# Patient Record
Sex: FEMALE | Race: WHITE | NOT HISPANIC OR LATINO | URBAN - METROPOLITAN AREA
[De-identification: names, ages, dates, MRNs, and addresses within clinical notes are randomized per-mention and may not be internally consistent; named-entity substitution may affect disease eponyms.]

---

## 2018-03-14 NOTE — ASU PATIENT PROFILE, ADULT - VISION (WITH CORRECTIVE LENSES IF THE PATIENT USUALLY WEARS THEM):
Partially impaired: cannot see medication labels or newsprint, but can see obstacles in path, and the surrounding layout; can count fingers at arm's length/wears glasses/contacts

## 2018-03-15 ENCOUNTER — INPATIENT (INPATIENT)
Facility: HOSPITAL | Age: 33
LOS: 0 days | Discharge: HOME | DRG: 781 | End: 2018-03-16
Attending: OBSTETRICS & GYNECOLOGY | Admitting: OBSTETRICS & GYNECOLOGY
Payer: COMMERCIAL

## 2018-03-15 ENCOUNTER — OUTPATIENT (OUTPATIENT)
Dept: OUTPATIENT SERVICES | Facility: HOSPITAL | Age: 33
LOS: 1 days | Discharge: HOME | End: 2018-03-15
Payer: COMMERCIAL

## 2018-03-15 ENCOUNTER — RESULT REVIEW (OUTPATIENT)
Age: 33
End: 2018-03-15

## 2018-03-15 VITALS
HEART RATE: 73 BPM | RESPIRATION RATE: 16 BRPM | OXYGEN SATURATION: 99 % | SYSTOLIC BLOOD PRESSURE: 101 MMHG | DIASTOLIC BLOOD PRESSURE: 64 MMHG | WEIGHT: 138.01 LBS

## 2018-03-15 VITALS
RESPIRATION RATE: 20 BRPM | OXYGEN SATURATION: 100 % | TEMPERATURE: 98 F | WEIGHT: 118.61 LBS | SYSTOLIC BLOOD PRESSURE: 115 MMHG | HEIGHT: 64 IN | HEART RATE: 68 BPM | DIASTOLIC BLOOD PRESSURE: 65 MMHG

## 2018-03-15 VITALS
HEART RATE: 86 BPM | DIASTOLIC BLOOD PRESSURE: 57 MMHG | OXYGEN SATURATION: 99 % | SYSTOLIC BLOOD PRESSURE: 97 MMHG | RESPIRATION RATE: 23 BRPM

## 2018-03-15 DIAGNOSIS — Z98.891 HISTORY OF UTERINE SCAR FROM PREVIOUS SURGERY: Chronic | ICD-10-CM

## 2018-03-15 DIAGNOSIS — Z98.890 OTHER SPECIFIED POSTPROCEDURAL STATES: Chronic | ICD-10-CM

## 2018-03-15 LAB
ALBUMIN SERPL ELPH-MCNC: 3.5 G/DL — SIGNIFICANT CHANGE UP (ref 3.3–5)
ALP SERPL-CCNC: 39 U/L — LOW (ref 40–120)
ALT FLD-CCNC: 18 U/L — SIGNIFICANT CHANGE UP (ref 10–45)
ANION GAP SERPL CALC-SCNC: 14 MMOL/L — SIGNIFICANT CHANGE UP (ref 5–17)
APTT BLD: 26.6 SEC — LOW (ref 27.5–37.4)
AST SERPL-CCNC: 19 U/L — SIGNIFICANT CHANGE UP (ref 10–40)
BILIRUB SERPL-MCNC: 0.7 MG/DL — SIGNIFICANT CHANGE UP (ref 0.2–1.2)
BUN SERPL-MCNC: 9 MG/DL — SIGNIFICANT CHANGE UP (ref 7–23)
CALCIUM SERPL-MCNC: 8.8 MG/DL — SIGNIFICANT CHANGE UP (ref 8.4–10.5)
CHLORIDE SERPL-SCNC: 97 MMOL/L — SIGNIFICANT CHANGE UP (ref 96–108)
CO2 SERPL-SCNC: 20 MMOL/L — LOW (ref 22–31)
CREAT SERPL-MCNC: 0.53 MG/DL — SIGNIFICANT CHANGE UP (ref 0.5–1.3)
GLUCOSE SERPL-MCNC: 155 MG/DL — HIGH (ref 70–99)
HCT VFR BLD CALC: 29.6 % — LOW (ref 34.5–45)
HGB BLD-MCNC: 9.8 G/DL — LOW (ref 11.5–15.5)
INR BLD: 1.15 — SIGNIFICANT CHANGE UP (ref 0.88–1.16)
LYMPHOCYTES # BLD AUTO: 13 % — SIGNIFICANT CHANGE UP (ref 13–44)
MCHC RBC-ENTMCNC: 28.5 PG — SIGNIFICANT CHANGE UP (ref 27–34)
MCHC RBC-ENTMCNC: 33.1 G/DL — SIGNIFICANT CHANGE UP (ref 32–36)
MCV RBC AUTO: 86 FL — SIGNIFICANT CHANGE UP (ref 80–100)
MONOCYTES NFR BLD AUTO: 3 % — SIGNIFICANT CHANGE UP (ref 2–14)
NEUTROPHILS NFR BLD AUTO: 80 % — HIGH (ref 43–77)
PLATELET # BLD AUTO: 244 K/UL — SIGNIFICANT CHANGE UP (ref 150–400)
POTASSIUM SERPL-MCNC: 3.4 MMOL/L — LOW (ref 3.5–5.3)
POTASSIUM SERPL-SCNC: 3.4 MMOL/L — LOW (ref 3.5–5.3)
PROT SERPL-MCNC: 6.2 G/DL — SIGNIFICANT CHANGE UP (ref 6–8.3)
PROTHROM AB SERPL-ACNC: 12.8 SEC — HIGH (ref 9.8–12.7)
RBC # BLD: 3.44 M/UL — LOW (ref 3.8–5.2)
RBC # FLD: 12.6 % — SIGNIFICANT CHANGE UP (ref 10.3–16.9)
SODIUM SERPL-SCNC: 131 MMOL/L — LOW (ref 135–145)
WBC # BLD: 15.6 K/UL — HIGH (ref 3.8–10.5)
WBC # FLD AUTO: 15.6 K/UL — HIGH (ref 3.8–10.5)

## 2018-03-15 PROCEDURE — 99285 EMERGENCY DEPT VISIT HI MDM: CPT | Mod: 25

## 2018-03-15 PROCEDURE — 88305 TISSUE EXAM BY PATHOLOGIST: CPT

## 2018-03-15 PROCEDURE — 93010 ELECTROCARDIOGRAM REPORT: CPT | Mod: 59

## 2018-03-15 PROCEDURE — 86900 BLOOD TYPING SEROLOGIC ABO: CPT

## 2018-03-15 PROCEDURE — 59820 CARE OF MISCARRIAGE: CPT

## 2018-03-15 PROCEDURE — 86850 RBC ANTIBODY SCREEN: CPT

## 2018-03-15 PROCEDURE — 86901 BLOOD TYPING SEROLOGIC RH(D): CPT

## 2018-03-15 RX ORDER — SODIUM CHLORIDE 9 MG/ML
1000 INJECTION INTRAMUSCULAR; INTRAVENOUS; SUBCUTANEOUS ONCE
Qty: 0 | Refills: 0 | Status: COMPLETED | OUTPATIENT
Start: 2018-03-15 | End: 2018-03-15

## 2018-03-15 RX ORDER — HYDROMORPHONE HYDROCHLORIDE 2 MG/ML
0.5 INJECTION INTRAMUSCULAR; INTRAVENOUS; SUBCUTANEOUS
Qty: 0 | Refills: 0 | Status: DISCONTINUED | OUTPATIENT
Start: 2018-03-15 | End: 2018-03-15

## 2018-03-15 RX ORDER — ACETAMINOPHEN 500 MG
650 TABLET ORAL EVERY 6 HOURS
Qty: 0 | Refills: 0 | Status: DISCONTINUED | OUTPATIENT
Start: 2018-03-15 | End: 2018-03-16

## 2018-03-15 RX ORDER — IBUPROFEN 200 MG
600 TABLET ORAL EVERY 6 HOURS
Qty: 0 | Refills: 0 | Status: DISCONTINUED | OUTPATIENT
Start: 2018-03-15 | End: 2018-03-16

## 2018-03-15 RX ORDER — SODIUM CHLORIDE 9 MG/ML
1000 INJECTION, SOLUTION INTRAVENOUS
Qty: 0 | Refills: 0 | Status: DISCONTINUED | OUTPATIENT
Start: 2018-03-15 | End: 2018-03-15

## 2018-03-15 RX ORDER — SODIUM CHLORIDE 9 MG/ML
1000 INJECTION, SOLUTION INTRAVENOUS
Qty: 0 | Refills: 0 | Status: DISCONTINUED | OUTPATIENT
Start: 2018-03-15 | End: 2018-03-16

## 2018-03-15 RX ORDER — KETOROLAC TROMETHAMINE 30 MG/ML
30 SYRINGE (ML) INJECTION ONCE
Qty: 0 | Refills: 0 | Status: DISCONTINUED | OUTPATIENT
Start: 2018-03-15 | End: 2018-03-15

## 2018-03-15 RX ORDER — OXYCODONE HYDROCHLORIDE 5 MG/1
10 TABLET ORAL ONCE
Qty: 0 | Refills: 0 | Status: DISCONTINUED | OUTPATIENT
Start: 2018-03-15 | End: 2018-03-15

## 2018-03-15 RX ADMIN — Medication 30 MILLIGRAM(S): at 17:51

## 2018-03-15 RX ADMIN — SODIUM CHLORIDE 125 MILLILITER(S): 9 INJECTION, SOLUTION INTRAVENOUS at 23:36

## 2018-03-15 RX ADMIN — SODIUM CHLORIDE 1000 MILLILITER(S): 9 INJECTION INTRAMUSCULAR; INTRAVENOUS; SUBCUTANEOUS at 20:25

## 2018-03-15 RX ADMIN — SODIUM CHLORIDE 1000 MILLILITER(S): 9 INJECTION INTRAMUSCULAR; INTRAVENOUS; SUBCUTANEOUS at 21:18

## 2018-03-15 RX ADMIN — Medication 30 MILLIGRAM(S): at 17:43

## 2018-03-15 NOTE — ED ADULT NURSE REASSESSMENT NOTE - NS ED NURSE REASSESS COMMENT FT1
pt remains aaxo3 no deficits no sob no chest pain no n/v.  no vaginal bleeding.  no dizziness.  color remains pale.  no shivering.  seen by OB/Gyn resident.

## 2018-03-15 NOTE — H&P ADULT - NSHPPHYSICALEXAM_GEN_ALL_CORE
Vital Signs Last 24 Hrs  T(C): 36.3 (15 Mar 2018 16:45), Max: 36.9 (15 Mar 2018 13:00)  T(F): 97.4 (15 Mar 2018 16:45), Max: 98.5 (15 Mar 2018 13:00)  HR: 74 (15 Mar 2018 22:20) (60 - 86)  BP: 93/66 (15 Mar 2018 22:20) (89/52 - 115/65)  BP(mean): 72 (15 Mar 2018 18:30) (66 - 72)  RR: 16 (15 Mar 2018 22:20) (11 - 23)  SpO2: 97% (15 Mar 2018 22:20) (97% - 100%)    Gen: Pale, NAD, AOx3  Chest: normal work of breathing  Abdomen: soft, nontender, nondistended, no rebound or guarding  Extremities: cool, MAEW

## 2018-03-15 NOTE — ED PROVIDER NOTE - OBJECTIVE STATEMENT
31 yo completed D&C today with syncopal episode in lobby for a few seconds. felt lightheaded per pt, no assoc seizure, prior cardiac activity, early family hx of cardiac disease. some vaginal spotting on pad after surgery but no heavy bleeding, per Dr. Leahy, surgery went well, no indication of perforation/rupture. pt was at 12 weeks. 33 yo completed D&C today with syncopal episode in lobby for a few seconds. felt lightheaded per pt, no assoc seizure, prior cardiac activity, early family hx of cardiac disease. some vaginal spotting on pad after surgery but no heavy bleeding, per Dr. Leahy, surgery went well, no indication of perforation/rupture. pt was at 12 weeks. NPO since am with surgery at 3pm.

## 2018-03-15 NOTE — PACU DISCHARGE NOTE - COMMENTS
VSS. Moderate vaginal bleeding noted. Denies pain. Voided 350cc. Tolerated PO fluids and light snack. D/C instructions given. Pt escorted home with  and mother. Safety maintained

## 2018-03-15 NOTE — H&P ADULT - ASSESSMENT
31 yo  POD0 s/p suction D&C for Usher syndrome at 12wks w/ an episode of syncope, now with stable vitals and no evidence of perforation or retained products. Will admit for IV hydration.  -Reg diet  -LR@125  -repeat CBC in AM  -SCDs  -Plan to dc in AM if stable

## 2018-03-15 NOTE — H&P ADULT - HISTORY OF PRESENT ILLNESS
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 Patient was seated at the time and did not fall or hit her head. Felt lightheaded when sitting upright but feels better after receiving 2 L IV hydration in the ER. Denies palpitations, SOB, heavy bleeding. +Chills.     D&C was uncomplicated, low concern for perforation per attending,  cc.    Obhx: C/S for breech at 38w, uncomplicated  Gynhx: denies  PMH: denies  PSH: C/S, suction D&C today, rhinoplasty  Meds: PNV  NKDA

## 2018-03-15 NOTE — ED ADULT NURSE NOTE - OBJECTIVE STATEMENT
pt is s/p D&C today and was discharged from same day surgery and had a syncopal episode witnessed by her .  rapid response was called and pt brought to ED. pt is s/p D&C today and was discharged from same day surgery and had a syncopal episode witnessed by her .  rapid response was called and pt brought to ED.  pt arrives aaox3.  color very pale.  skin cool and damp.  no sob.  no n/v.  no chest pain.  no dizziness.  states she feels weak.

## 2018-03-15 NOTE — H&P ADULT - NSHPLABSRESULTS_GEN_ALL_CORE
9.8    15.6  )-----------( 244      ( 15 Mar 2018 20:36 )             29.6   03-15    131<L>  |  97  |  9   ----------------------------<  155<H>  3.4<L>   |  20<L>  |  0.53    Ca    8.8      15 Mar 2018 20:36    TPro  6.2  /  Alb  3.5  /  TBili  0.7  /  DBili  x   /  AST  19  /  ALT  18  /  AlkPhos  39<L>  03-15    Bedside sono performed with senior resident Dr. Rodriguez, PGY4:  no free fluid in the abdomen, no IUP, endometrial lining 1.3 mm

## 2018-03-16 ENCOUNTER — TRANSCRIPTION ENCOUNTER (OUTPATIENT)
Age: 33
End: 2018-03-16

## 2018-03-16 VITALS
TEMPERATURE: 98 F | OXYGEN SATURATION: 98 % | SYSTOLIC BLOOD PRESSURE: 92 MMHG | HEART RATE: 97 BPM | DIASTOLIC BLOOD PRESSURE: 50 MMHG | RESPIRATION RATE: 16 BRPM

## 2018-03-16 LAB
HCT VFR BLD CALC: 23.4 % — LOW (ref 34.5–45)
HGB BLD-MCNC: 7.7 G/DL — LOW (ref 11.5–15.5)
MCHC RBC-ENTMCNC: 28.8 PG — SIGNIFICANT CHANGE UP (ref 27–34)
MCHC RBC-ENTMCNC: 32.9 G/DL — SIGNIFICANT CHANGE UP (ref 32–36)
MCV RBC AUTO: 87.6 FL — SIGNIFICANT CHANGE UP (ref 80–100)
PLATELET # BLD AUTO: 170 K/UL — SIGNIFICANT CHANGE UP (ref 150–400)
RBC # BLD: 2.67 M/UL — LOW (ref 3.8–5.2)
RBC # FLD: 12.4 % — SIGNIFICANT CHANGE UP (ref 10.3–16.9)
WBC # BLD: 10.2 K/UL — SIGNIFICANT CHANGE UP (ref 3.8–10.5)
WBC # FLD AUTO: 10.2 K/UL — SIGNIFICANT CHANGE UP (ref 3.8–10.5)

## 2018-03-16 PROCEDURE — 82962 GLUCOSE BLOOD TEST: CPT

## 2018-03-16 PROCEDURE — 80053 COMPREHEN METABOLIC PANEL: CPT

## 2018-03-16 PROCEDURE — 85025 COMPLETE CBC W/AUTO DIFF WBC: CPT

## 2018-03-16 PROCEDURE — 99285 EMERGENCY DEPT VISIT HI MDM: CPT | Mod: 25

## 2018-03-16 PROCEDURE — 85730 THROMBOPLASTIN TIME PARTIAL: CPT

## 2018-03-16 PROCEDURE — 85610 PROTHROMBIN TIME: CPT

## 2018-03-16 PROCEDURE — 85027 COMPLETE CBC AUTOMATED: CPT

## 2018-03-16 PROCEDURE — 93005 ELECTROCARDIOGRAM TRACING: CPT

## 2018-03-16 PROCEDURE — 36415 COLL VENOUS BLD VENIPUNCTURE: CPT

## 2018-03-16 RX ADMIN — Medication 600 MILLIGRAM(S): at 10:50

## 2018-03-16 RX ADMIN — Medication 600 MILLIGRAM(S): at 10:24

## 2018-03-16 NOTE — DISCHARGE NOTE ADULT - HOSPITAL COURSE
31yo s/p suction dilation and curettage. Pt is hemodynamically stable at time of discharge. 31yo s/p suction dilation and curettage. Post-operative course complicated by syncopal episode win recovery room. Pt is hemodynamically stable at time of discharge.

## 2018-03-16 NOTE — PROGRESS NOTE ADULT - SUBJECTIVE AND OBJECTIVE BOX
Pt evaluated at bedside. She reports mild weakness improved since yesterday. She denies lightheadedness, dizziness. She denies any vaginal bleeding.  She reports pain is well controlled.      T(C): 37.3 (03-16-18 @ 05:47), Max: 37.3 (03-16-18 @ 05:47)  HR: 60 (03-16-18 @ 05:47) (60 - 86)  BP: 88/52 (03-16-18 @ 05:47) (88/52 - 105/61)  RR: 16 (03-16-18 @ 05:47) (16 - 18)  SpO2: 98% (03-16-18 @ 05:47) (97% - 99%)  GA: NAD  CV: RRR, no MRG  Pulm: CTAB  Abd: soft, nondistended, appropriately tender, no rebound or guarding  Extrem: no calf tenderness to palpation    03-15 @ 07:01  -  03-16 @ 06:43  --------------------------------------------------------  IN: 500 mL / OUT: 700 mL / NET: -200 mL                              9.8    15.6  )-----------( 244      ( 15 Mar 2018 20:36 )             29.6     03-15    131<L>  |  97  |  9   ----------------------------<  155<H>  3.4<L>   |  20<L>  |  0.53    Ca    8.8      15 Mar 2018 20:36    TPro  6.2  /  Alb  3.5  /  TBili  0.7  /  DBili  x   /  AST  19  /  ALT  18  /  AlkPhos  39<L>  03-15

## 2018-03-16 NOTE — DISCHARGE NOTE ADULT - CARE PLAN
Principal Discharge DX:	Termination of pregnancy (fetus)  Goal:	Recovery  Assessment and plan of treatment:	Pelvic rest x2 weeks (nothing in vagina). Schedule follow up appointment with Dr. Dodge in 1-2 weeks. Go to nearest ED if fever >100.4, severe abdominal pain or heavy vaginal bleeding.

## 2018-03-16 NOTE — DISCHARGE NOTE ADULT - PATIENT PORTAL LINK FT
You can access the [facility] Patient Portal, offered by [facility], by registering with the following website: [email or web address]

## 2018-03-16 NOTE — DISCHARGE NOTE ADULT - PLAN OF CARE
Recovery Pelvic rest x2 weeks (nothing in vagina). Schedule follow up appointment with Dr. Dodge in 1-2 weeks. Go to nearest ED if fever >100.4, severe abdominal pain or heavy vaginal bleeding.

## 2018-03-16 NOTE — PROGRESS NOTE ADULT - ASSESSMENT
32y Female POD# 1   s/p suction D&C readmitted for syncopal episode in waiting room upon discharge. Hgb 9.9 from 12.2 preoperatively. Pt reports improvement in symptoms. F/u AM CBC.                                      1. Neuro/Pain:  OPM  2  CV:   VS per routine  3. Pulm: Encourage ISS  4. GI: Reg  5. :  Voiding  6. Heme: f/u AM CBC  7. ID: --  8. DVT ppx: SCDs  9. Dispo: pending stabilization of hgb

## 2018-03-16 NOTE — PATIENT PROFILE ADULT. - VISION (WITH CORRECTIVE LENSES IF THE PATIENT USUALLY WEARS THEM):
wears glasses/contacts/Partially impaired: cannot see medication labels or newsprint, but can see obstacles in path, and the surrounding layout; can count fingers at arm's length

## 2018-03-19 DIAGNOSIS — R55 SYNCOPE AND COLLAPSE: ICD-10-CM

## 2018-03-19 DIAGNOSIS — Z98.890 OTHER SPECIFIED POSTPROCEDURAL STATES: ICD-10-CM

## 2018-03-19 DIAGNOSIS — Z28.21 IMMUNIZATION NOT CARRIED OUT BECAUSE OF PATIENT REFUSAL: ICD-10-CM

## 2018-03-19 DIAGNOSIS — O35.8XX0 MATERNAL CARE FOR OTHER (SUSPECTED) FETAL ABNORMALITY AND DAMAGE, NOT APPLICABLE OR UNSPECIFIED: ICD-10-CM

## 2018-03-19 DIAGNOSIS — Z98.891 HISTORY OF UTERINE SCAR FROM PREVIOUS SURGERY: ICD-10-CM

## 2018-03-19 DIAGNOSIS — Z3A.12 12 WEEKS GESTATION OF PREGNANCY: ICD-10-CM

## 2018-03-19 DIAGNOSIS — O99.89 OTHER SPECIFIED DISEASES AND CONDITIONS COMPLICATING PREGNANCY, CHILDBIRTH AND THE PUERPERIUM: ICD-10-CM

## 2018-03-21 LAB — SURGICAL PATHOLOGY STUDY: SIGNIFICANT CHANGE UP

## 2019-05-06 PROBLEM — Z00.00 ENCOUNTER FOR PREVENTIVE HEALTH EXAMINATION: Status: ACTIVE | Noted: 2019-05-06

## 2019-05-14 ENCOUNTER — APPOINTMENT (OUTPATIENT)
Dept: ANTEPARTUM | Facility: CLINIC | Age: 34
End: 2019-05-14
Payer: COMMERCIAL

## 2019-05-14 PROCEDURE — 76819 FETAL BIOPHYS PROFIL W/O NST: CPT

## 2019-05-14 PROCEDURE — 76820 UMBILICAL ARTERY ECHO: CPT

## 2019-05-20 ENCOUNTER — INPATIENT (INPATIENT)
Facility: HOSPITAL | Age: 34
LOS: 2 days | Discharge: HOME | End: 2019-05-23
Attending: OBSTETRICS & GYNECOLOGY | Admitting: OBSTETRICS & GYNECOLOGY
Payer: COMMERCIAL

## 2019-05-20 VITALS — WEIGHT: 163.36 LBS | HEIGHT: 64 IN

## 2019-05-20 DIAGNOSIS — Z98.890 OTHER SPECIFIED POSTPROCEDURAL STATES: Chronic | ICD-10-CM

## 2019-05-20 DIAGNOSIS — Z98.891 HISTORY OF UTERINE SCAR FROM PREVIOUS SURGERY: Chronic | ICD-10-CM

## 2019-05-20 LAB
BASOPHILS # BLD AUTO: 0 K/UL — SIGNIFICANT CHANGE UP (ref 0–0.2)
BASOPHILS NFR BLD AUTO: 0 % — SIGNIFICANT CHANGE UP (ref 0–2)
EOSINOPHIL # BLD AUTO: 0.41 K/UL — SIGNIFICANT CHANGE UP (ref 0–0.5)
EOSINOPHIL NFR BLD AUTO: 3.5 % — SIGNIFICANT CHANGE UP (ref 0–6)
HCT VFR BLD CALC: 34.9 % — SIGNIFICANT CHANGE UP (ref 34.5–45)
HGB BLD-MCNC: 11.1 G/DL — LOW (ref 11.5–15.5)
LYMPHOCYTES # BLD AUTO: 1.44 K/UL — SIGNIFICANT CHANGE UP (ref 1–3.3)
LYMPHOCYTES # BLD AUTO: 12.2 % — LOW (ref 13–44)
MCHC RBC-ENTMCNC: 28.8 PG — SIGNIFICANT CHANGE UP (ref 27–34)
MCHC RBC-ENTMCNC: 31.8 GM/DL — LOW (ref 32–36)
MCV RBC AUTO: 90.4 FL — SIGNIFICANT CHANGE UP (ref 80–100)
MONOCYTES # BLD AUTO: 0.72 K/UL — SIGNIFICANT CHANGE UP (ref 0–0.9)
MONOCYTES NFR BLD AUTO: 6.1 % — SIGNIFICANT CHANGE UP (ref 2–14)
NEUTROPHILS # BLD AUTO: 9.03 K/UL — HIGH (ref 1.8–7.4)
NEUTROPHILS NFR BLD AUTO: 75.6 % — SIGNIFICANT CHANGE UP (ref 43–77)
PLATELET # BLD AUTO: 235 K/UL — SIGNIFICANT CHANGE UP (ref 150–400)
RBC # BLD: 3.86 M/UL — SIGNIFICANT CHANGE UP (ref 3.8–5.2)
RBC # FLD: 14 % — SIGNIFICANT CHANGE UP (ref 10.3–14.5)
T PALLIDUM AB TITR SER: NEGATIVE — SIGNIFICANT CHANGE UP
WBC # BLD: 11.82 K/UL — HIGH (ref 3.8–10.5)
WBC # FLD AUTO: 11.82 K/UL — HIGH (ref 3.8–10.5)

## 2019-05-20 RX ORDER — MAGNESIUM HYDROXIDE 400 MG/1
30 TABLET, CHEWABLE ORAL
Refills: 0 | Status: DISCONTINUED | OUTPATIENT
Start: 2019-05-20 | End: 2019-05-23

## 2019-05-20 RX ORDER — TETANUS TOXOID, REDUCED DIPHTHERIA TOXOID AND ACELLULAR PERTUSSIS VACCINE, ADSORBED 5; 2.5; 8; 8; 2.5 [IU]/.5ML; [IU]/.5ML; UG/.5ML; UG/.5ML; UG/.5ML
0.5 SUSPENSION INTRAMUSCULAR ONCE
Refills: 0 | Status: DISCONTINUED | OUTPATIENT
Start: 2019-05-20 | End: 2019-05-23

## 2019-05-20 RX ORDER — NALOXONE HYDROCHLORIDE 4 MG/.1ML
0.1 SPRAY NASAL
Refills: 0 | Status: DISCONTINUED | OUTPATIENT
Start: 2019-05-20 | End: 2019-05-23

## 2019-05-20 RX ORDER — FAMOTIDINE 10 MG/ML
20 INJECTION INTRAVENOUS ONCE
Refills: 0 | Status: DISCONTINUED | OUTPATIENT
Start: 2019-05-20 | End: 2019-05-20

## 2019-05-20 RX ORDER — DOCUSATE SODIUM 100 MG
100 CAPSULE ORAL
Refills: 0 | Status: DISCONTINUED | OUTPATIENT
Start: 2019-05-20 | End: 2019-05-23

## 2019-05-20 RX ORDER — SODIUM CHLORIDE 9 MG/ML
1000 INJECTION, SOLUTION INTRAVENOUS
Refills: 0 | Status: DISCONTINUED | OUTPATIENT
Start: 2019-05-20 | End: 2019-05-23

## 2019-05-20 RX ORDER — IBUPROFEN 200 MG
600 TABLET ORAL EVERY 6 HOURS
Refills: 0 | Status: DISCONTINUED | OUTPATIENT
Start: 2019-05-20 | End: 2019-05-23

## 2019-05-20 RX ORDER — SODIUM CHLORIDE 9 MG/ML
1000 INJECTION, SOLUTION INTRAVENOUS
Refills: 0 | Status: DISCONTINUED | OUTPATIENT
Start: 2019-05-20 | End: 2019-05-20

## 2019-05-20 RX ORDER — DIPHENHYDRAMINE HCL 50 MG
25 CAPSULE ORAL EVERY 6 HOURS
Refills: 0 | Status: DISCONTINUED | OUTPATIENT
Start: 2019-05-20 | End: 2019-05-23

## 2019-05-20 RX ORDER — OXYTOCIN 10 UNIT/ML
333.33 VIAL (ML) INJECTION
Qty: 20 | Refills: 0 | Status: DISCONTINUED | OUTPATIENT
Start: 2019-05-20 | End: 2019-05-23

## 2019-05-20 RX ORDER — MORPHINE SULFATE 50 MG/1
0.3 CAPSULE, EXTENDED RELEASE ORAL ONCE
Refills: 0 | Status: DISCONTINUED | OUTPATIENT
Start: 2019-05-20 | End: 2019-05-23

## 2019-05-20 RX ORDER — CEFAZOLIN SODIUM 1 G
2000 VIAL (EA) INJECTION ONCE
Refills: 0 | Status: COMPLETED | OUTPATIENT
Start: 2019-05-20 | End: 2019-05-20

## 2019-05-20 RX ORDER — CITRIC ACID/SODIUM CITRATE 300-500 MG
30 SOLUTION, ORAL ORAL ONCE
Refills: 0 | Status: COMPLETED | OUTPATIENT
Start: 2019-05-20 | End: 2019-05-20

## 2019-05-20 RX ORDER — METOCLOPRAMIDE HCL 10 MG
10 TABLET ORAL ONCE
Refills: 0 | Status: DISCONTINUED | OUTPATIENT
Start: 2019-05-20 | End: 2019-05-20

## 2019-05-20 RX ORDER — OXYTOCIN 10 UNIT/ML
333.33 VIAL (ML) INJECTION
Qty: 20 | Refills: 0 | Status: DISCONTINUED | OUTPATIENT
Start: 2019-05-20 | End: 2019-05-20

## 2019-05-20 RX ORDER — OXYCODONE HYDROCHLORIDE 5 MG/1
5 TABLET ORAL
Refills: 0 | Status: DISCONTINUED | OUTPATIENT
Start: 2019-05-20 | End: 2019-05-23

## 2019-05-20 RX ORDER — ONDANSETRON 8 MG/1
4 TABLET, FILM COATED ORAL EVERY 6 HOURS
Refills: 0 | Status: DISCONTINUED | OUTPATIENT
Start: 2019-05-20 | End: 2019-05-23

## 2019-05-20 RX ORDER — LANOLIN
1 OINTMENT (GRAM) TOPICAL EVERY 6 HOURS
Refills: 0 | Status: DISCONTINUED | OUTPATIENT
Start: 2019-05-20 | End: 2019-05-23

## 2019-05-20 RX ORDER — ACETAMINOPHEN 500 MG
975 TABLET ORAL EVERY 8 HOURS
Refills: 0 | Status: DISCONTINUED | OUTPATIENT
Start: 2019-05-21 | End: 2019-05-23

## 2019-05-20 RX ORDER — OXYCODONE HYDROCHLORIDE 5 MG/1
5 TABLET ORAL ONCE
Refills: 0 | Status: DISCONTINUED | OUTPATIENT
Start: 2019-05-20 | End: 2019-05-23

## 2019-05-20 RX ORDER — SIMETHICONE 80 MG/1
80 TABLET, CHEWABLE ORAL EVERY 4 HOURS
Refills: 0 | Status: DISCONTINUED | OUTPATIENT
Start: 2019-05-20 | End: 2019-05-23

## 2019-05-20 RX ORDER — CHLORHEXIDINE GLUCONATE 213 G/1000ML
1 SOLUTION TOPICAL ONCE
Refills: 0 | Status: COMPLETED | OUTPATIENT
Start: 2019-05-20 | End: 2019-05-20

## 2019-05-20 RX ORDER — DEXAMETHASONE 0.5 MG/5ML
4 ELIXIR ORAL EVERY 6 HOURS
Refills: 0 | Status: DISCONTINUED | OUTPATIENT
Start: 2019-05-20 | End: 2019-05-23

## 2019-05-20 RX ORDER — SERTRALINE 25 MG/1
25 TABLET, FILM COATED ORAL DAILY
Refills: 0 | Status: DISCONTINUED | OUTPATIENT
Start: 2019-05-21 | End: 2019-05-23

## 2019-05-20 RX ORDER — GLYCERIN ADULT
1 SUPPOSITORY, RECTAL RECTAL AT BEDTIME
Refills: 0 | Status: DISCONTINUED | OUTPATIENT
Start: 2019-05-20 | End: 2019-05-23

## 2019-05-20 RX ORDER — SODIUM CHLORIDE 9 MG/ML
1000 INJECTION, SOLUTION INTRAVENOUS ONCE
Refills: 0 | Status: COMPLETED | OUTPATIENT
Start: 2019-05-20 | End: 2019-05-20

## 2019-05-20 RX ORDER — IBUPROFEN 200 MG
600 TABLET ORAL EVERY 6 HOURS
Refills: 0 | Status: COMPLETED | OUTPATIENT
Start: 2019-05-20 | End: 2020-04-17

## 2019-05-20 RX ADMIN — CHLORHEXIDINE GLUCONATE 1 APPLICATION(S): 213 SOLUTION TOPICAL at 13:05

## 2019-05-20 RX ADMIN — SODIUM CHLORIDE 2000 MILLILITER(S): 9 INJECTION, SOLUTION INTRAVENOUS at 11:05

## 2019-05-20 RX ADMIN — Medication 600 MILLIGRAM(S): at 17:50

## 2019-05-20 RX ADMIN — Medication 100 MILLIGRAM(S): at 20:29

## 2019-05-20 RX ADMIN — Medication 30 MILLILITER(S): at 13:20

## 2019-05-20 RX ADMIN — Medication 1 APPLICATION(S): at 20:30

## 2019-05-20 RX ADMIN — Medication 975 MILLIGRAM(S): at 23:30

## 2019-05-20 RX ADMIN — SODIUM CHLORIDE 125 MILLILITER(S): 9 INJECTION, SOLUTION INTRAVENOUS at 12:11

## 2019-05-20 RX ADMIN — Medication 975 MILLIGRAM(S): at 22:30

## 2019-05-20 RX ADMIN — Medication 100 MILLIGRAM(S): at 13:19

## 2019-05-20 RX ADMIN — SODIUM CHLORIDE 125 MILLILITER(S): 9 INJECTION, SOLUTION INTRAVENOUS at 22:11

## 2019-05-21 LAB
BASOPHILS # BLD AUTO: 0.15 K/UL — SIGNIFICANT CHANGE UP (ref 0–0.2)
BASOPHILS NFR BLD AUTO: 0.9 % — SIGNIFICANT CHANGE UP (ref 0–2)
EOSINOPHIL # BLD AUTO: 0.28 K/UL — SIGNIFICANT CHANGE UP (ref 0–0.5)
EOSINOPHIL NFR BLD AUTO: 1.7 % — SIGNIFICANT CHANGE UP (ref 0–6)
HCT VFR BLD CALC: 32.8 % — LOW (ref 34.5–45)
HGB BLD-MCNC: 10.3 G/DL — LOW (ref 11.5–15.5)
LYMPHOCYTES # BLD AUTO: 13.9 % — SIGNIFICANT CHANGE UP (ref 13–44)
LYMPHOCYTES # BLD AUTO: 2.28 K/UL — SIGNIFICANT CHANGE UP (ref 1–3.3)
MCHC RBC-ENTMCNC: 29 PG — SIGNIFICANT CHANGE UP (ref 27–34)
MCHC RBC-ENTMCNC: 31.4 GM/DL — LOW (ref 32–36)
MCV RBC AUTO: 92.4 FL — SIGNIFICANT CHANGE UP (ref 80–100)
MONOCYTES # BLD AUTO: 0.72 K/UL — SIGNIFICANT CHANGE UP (ref 0–0.9)
MONOCYTES NFR BLD AUTO: 4.4 % — SIGNIFICANT CHANGE UP (ref 2–14)
NEUTROPHILS # BLD AUTO: 12.67 K/UL — HIGH (ref 1.8–7.4)
NEUTROPHILS NFR BLD AUTO: 77.4 % — HIGH (ref 43–77)
PLATELET # BLD AUTO: 202 K/UL — SIGNIFICANT CHANGE UP (ref 150–400)
RBC # BLD: 3.55 M/UL — LOW (ref 3.8–5.2)
RBC # FLD: 13.9 % — SIGNIFICANT CHANGE UP (ref 10.3–14.5)
WBC # BLD: 16.37 K/UL — HIGH (ref 3.8–10.5)
WBC # FLD AUTO: 16.37 K/UL — HIGH (ref 3.8–10.5)

## 2019-05-21 RX ORDER — FERROUS SULFATE 325(65) MG
325 TABLET ORAL DAILY
Refills: 0 | Status: DISCONTINUED | OUTPATIENT
Start: 2019-05-21 | End: 2019-05-23

## 2019-05-21 RX ADMIN — Medication 1 TABLET(S): at 14:55

## 2019-05-21 RX ADMIN — Medication 600 MILLIGRAM(S): at 21:26

## 2019-05-21 RX ADMIN — Medication 100 MILLIGRAM(S): at 09:19

## 2019-05-21 RX ADMIN — Medication 975 MILLIGRAM(S): at 19:25

## 2019-05-21 RX ADMIN — Medication 600 MILLIGRAM(S): at 08:49

## 2019-05-21 RX ADMIN — SERTRALINE 25 MILLIGRAM(S): 25 TABLET, FILM COATED ORAL at 09:19

## 2019-05-21 RX ADMIN — Medication 600 MILLIGRAM(S): at 02:04

## 2019-05-21 RX ADMIN — Medication 600 MILLIGRAM(S): at 15:30

## 2019-05-21 RX ADMIN — Medication 600 MILLIGRAM(S): at 22:00

## 2019-05-21 RX ADMIN — Medication 975 MILLIGRAM(S): at 09:17

## 2019-05-21 RX ADMIN — Medication 975 MILLIGRAM(S): at 18:52

## 2019-05-21 RX ADMIN — Medication 600 MILLIGRAM(S): at 07:49

## 2019-05-21 RX ADMIN — Medication 325 MILLIGRAM(S): at 14:54

## 2019-05-21 RX ADMIN — Medication 975 MILLIGRAM(S): at 09:40

## 2019-05-21 RX ADMIN — SIMETHICONE 80 MILLIGRAM(S): 80 TABLET, CHEWABLE ORAL at 09:18

## 2019-05-21 RX ADMIN — Medication 600 MILLIGRAM(S): at 02:39

## 2019-05-21 RX ADMIN — SIMETHICONE 80 MILLIGRAM(S): 80 TABLET, CHEWABLE ORAL at 14:55

## 2019-05-21 RX ADMIN — Medication 600 MILLIGRAM(S): at 14:55

## 2019-05-22 ENCOUNTER — TRANSCRIPTION ENCOUNTER (OUTPATIENT)
Age: 34
End: 2019-05-22

## 2019-05-22 RX ORDER — SERTRALINE 25 MG/1
1 TABLET, FILM COATED ORAL
Qty: 0 | Refills: 0 | DISCHARGE

## 2019-05-22 RX ADMIN — Medication 600 MILLIGRAM(S): at 07:50

## 2019-05-22 RX ADMIN — Medication 1 APPLICATION(S): at 11:12

## 2019-05-22 RX ADMIN — Medication 975 MILLIGRAM(S): at 20:22

## 2019-05-22 RX ADMIN — Medication 975 MILLIGRAM(S): at 11:11

## 2019-05-22 RX ADMIN — Medication 975 MILLIGRAM(S): at 19:10

## 2019-05-22 RX ADMIN — Medication 975 MILLIGRAM(S): at 03:30

## 2019-05-22 RX ADMIN — Medication 1 TABLET(S): at 18:11

## 2019-05-22 RX ADMIN — Medication 1 TABLET(S): at 11:11

## 2019-05-22 RX ADMIN — Medication 100 MILLIGRAM(S): at 11:11

## 2019-05-22 RX ADMIN — Medication 975 MILLIGRAM(S): at 03:08

## 2019-05-22 RX ADMIN — Medication 600 MILLIGRAM(S): at 12:40

## 2019-05-22 RX ADMIN — SERTRALINE 25 MILLIGRAM(S): 25 TABLET, FILM COATED ORAL at 11:12

## 2019-05-22 RX ADMIN — Medication 975 MILLIGRAM(S): at 12:00

## 2019-05-22 RX ADMIN — Medication 325 MILLIGRAM(S): at 11:11

## 2019-05-22 RX ADMIN — Medication 600 MILLIGRAM(S): at 07:11

## 2019-05-22 RX ADMIN — Medication 600 MILLIGRAM(S): at 13:30

## 2019-05-22 RX ADMIN — Medication 600 MILLIGRAM(S): at 18:11

## 2019-05-22 NOTE — DISCHARGE NOTE OB - PATIENT PORTAL LINK FT
You can access the [facility] Patient Portal, offered by [facility], by registering with the following website: [email or web address]

## 2019-05-22 NOTE — DISCHARGE NOTE OB - CARE PROVIDER_API CALL
Licha Dodge)  Obstetrics and Gynecology  [address] [place] [address]  Phone: [phone]  Fax: [phone]  Follow Up Time:

## 2019-05-22 NOTE — DISCHARGE NOTE OB - CARE PLAN
Principal Discharge DX:	Delivered by  delivery following previous  delivery  Goal:	routine post partum care  Assessment and plan of treatment:	no heavy lifting no heavy carrying, showers only no baths

## 2019-05-22 NOTE — DISCHARGE NOTE OB - MATERIALS PROVIDED
MMR Vaccination (VIS Pub Date: 2012)/Tdap Vaccination (VIS Pub Date: 2012)/Bottle Feeding Log/Vaccinations/[facility] Hearing Screen Program/Shaken Baby Prevention Handout/Back To Sleep Handout/[facility]  Screening Program/  Immunization Record/Guide to Postpartum Care

## 2019-05-23 VITALS
RESPIRATION RATE: 17 BRPM | TEMPERATURE: 98 F | SYSTOLIC BLOOD PRESSURE: 108 MMHG | DIASTOLIC BLOOD PRESSURE: 74 MMHG | OXYGEN SATURATION: 99 % | HEART RATE: 58 BPM

## 2019-05-23 PROCEDURE — 86901 BLOOD TYPING SEROLOGIC RH(D): CPT

## 2019-05-23 PROCEDURE — 86850 RBC ANTIBODY SCREEN: CPT

## 2019-05-23 PROCEDURE — 86780 TREPONEMA PALLIDUM: CPT

## 2019-05-23 PROCEDURE — 36415 COLL VENOUS BLD VENIPUNCTURE: CPT

## 2019-05-23 PROCEDURE — 86900 BLOOD TYPING SEROLOGIC ABO: CPT

## 2019-05-23 PROCEDURE — 85025 COMPLETE CBC W/AUTO DIFF WBC: CPT

## 2019-05-23 RX ADMIN — Medication 975 MILLIGRAM(S): at 03:20

## 2019-05-23 RX ADMIN — Medication 600 MILLIGRAM(S): at 06:15

## 2019-05-23 RX ADMIN — Medication 325 MILLIGRAM(S): at 12:24

## 2019-05-23 RX ADMIN — Medication 1 TABLET(S): at 06:18

## 2019-05-23 RX ADMIN — SERTRALINE 25 MILLIGRAM(S): 25 TABLET, FILM COATED ORAL at 09:33

## 2019-05-23 RX ADMIN — Medication 100 MILLIGRAM(S): at 12:24

## 2019-05-23 RX ADMIN — Medication 600 MILLIGRAM(S): at 06:58

## 2019-05-23 RX ADMIN — Medication 600 MILLIGRAM(S): at 01:10

## 2019-05-23 RX ADMIN — Medication 975 MILLIGRAM(S): at 04:20

## 2019-05-23 RX ADMIN — Medication 600 MILLIGRAM(S): at 00:27

## 2019-05-23 RX ADMIN — Medication 1 TABLET(S): at 12:24

## 2019-05-23 RX ADMIN — Medication 975 MILLIGRAM(S): at 10:57

## 2019-05-23 RX ADMIN — Medication 600 MILLIGRAM(S): at 12:24

## 2019-05-23 NOTE — PROGRESS NOTE ADULT - SUBJECTIVE AND OBJECTIVE BOX
Patient evaluated at bedside this morning, resting comfortable in bed.   She reports pain is well controlled with motrin and tylenol.   She denies headache, dizziness, chest pain, palpitations, shortness of breathe, nausea, vomiting or heavy vaginal bleeding.  She has been ambulating without assistance, voiding spontaneously, passing gas, tolerating regular diet and is breastfeeding.    Physical Exam:  Vital Signs Last 24 Hrs  T(C): 36.5 (23 May 2019 06:05), Max: 36.5 (23 May 2019 06:05)  T(F): 97.7 (23 May 2019 06:05), Max: 97.7 (23 May 2019 06:05)  HR: 58 (23 May 2019 06:05) (58 - 68)  BP: 108/74 (23 May 2019 06:05) (108/74 - 126/77)  BP(mean): --  RR: 17 (23 May 2019 06:05) (17 - 18)  SpO2: 99% (23 May 2019 06:05) (98% - 99%)    GA: NAD, A+0 x 3  CV: RRR  Pulm: CTAB  Breasts: soft, nontender, no palpable masses  Abd: + BS, soft, nontender, nondistended, no rebound or guarding, incision clean, dry and intact, uterus firm at midline,2  fb below umbilicus  : lochia WNL  Extremities: no swelling or calf tenderness
POD 2   Stable afebrile, tolerating po well  Voiding and flatus  Abdomen less distended - ext wnl  will CPM and restart augmentin 875mg bid for previous sinus surgery
Patient evaluated at bedside this morning, resting comfortable in bed, with no acute events overnight.  She reports pain is well controlled with motrin and tylenol.   She denies headache, dizziness, chest pain, palpitations, shortness of breathe, nausea, vomiting or heavy vaginal bleeding.  She has not tried ambulating since procedure. Tolerating clear liquids. Passing flatus. Christopher removed this morning, has not urinated yet.     Physical Exam:  Vital Signs Last 24 Hrs  T(C): 36.4 (21 May 2019 06:20), Max: 36.6 (20 May 2019 18:00)  T(F): 97.5 (21 May 2019 06:20), Max: 97.9 (20 May 2019 18:00)  HR: 63 (21 May 2019 06:20) (62 - 82)  BP: 101/64 (21 May 2019 06:20) (99/55 - 123/71)  BP(mean): --  RR: 18 (21 May 2019 06:20) (16 - 18)  SpO2: 96% (21 May 2019 06:20) (95% - 100%)    GA: NAD, A+0 x 3  CV: RRR, no murmurs/rubs  Pulm: CTAB, no wheezes/rhonchi  Breasts: soft, nontender, no palpable masses  Abd: + BS, soft, nontender, nondistended, no rebound or guarding, incision clean, dry and intact, uterus firm at midline,  2 fb below umbilicus  :  lochia WNL  Extremities: no swelling or calf tenderness, reflexes +2 bilaterally, SCD in place                            10.3   16.37 )-----------( 202      ( 21 May 2019 05:46 )             32.8               acetaminophen   Tablet .. 975 milliGRAM(s) Oral every 8 hours  dexamethasone  Injectable 4 milliGRAM(s) IV Push every 6 hours PRN  diphenhydrAMINE 25 milliGRAM(s) Oral every 6 hours PRN  diphtheria/tetanus/pertussis (acellular) Vaccine (ADAcel) 0.5 milliLiter(s) IntraMuscular once  docusate sodium 100 milliGRAM(s) Oral two times a day PRN  glycerin Suppository - Adult 1 Suppository(s) Rectal at bedtime PRN  ibuprofen  Tablet. 600 milliGRAM(s) Oral every 6 hours  lactated ringers. 1000 milliLiter(s) IV Continuous <Continuous>  lanolin Ointment 1 Application(s) Topical every 6 hours PRN  magnesium hydroxide Suspension 30 milliLiter(s) Oral two times a day PRN  morphine PF Spinal 0.3 milliGRAM(s) IntraThecal. once  naloxone Injectable 0.1 milliGRAM(s) IV Push every 3 minutes PRN  ondansetron Injectable 4 milliGRAM(s) IV Push every 6 hours PRN  oxyCODONE    IR 5 milliGRAM(s) Oral every 3 hours PRN  oxyCODONE    IR 5 milliGRAM(s) Oral once PRN  oxytocin Infusion 333.333 milliUNIT(s)/Min IV Continuous <Continuous>  sertraline 25 milliGRAM(s) Oral daily  simethicone 80 milliGRAM(s) Chew every 4 hours PRN
Patient evaluated at bedside this morning, resting comfortable in bed.   She reports pain is well controlled with motrin and tylenol.   She denies headache, dizziness, chest pain, palpitations, shortness of breathe, nausea, vomiting or heavy vaginal bleeding.  She has been ambulating without assistance, voiding spontaneously, passing gas, tolerating regular diet and is breastfeeding.    Physical Exam:  Vital Signs Last 24 Hrs  T(C): 36.7 (22 May 2019 02:00), Max: 36.7 (22 May 2019 02:00)  T(F): 98 (22 May 2019 02:00), Max: 98 (22 May 2019 02:00)  HR: 64 (22 May 2019 02:00) (64 - 68)  BP: 108/62 (22 May 2019 02:00) (98/62 - 108/62)  BP(mean): --  RR: 17 (22 May 2019 02:00) (17 - 18)  SpO2: 97% (22 May 2019 02:00) (96% - 97%)    GA: NAD, A+0 x 3  CV: RRR  Pulm: CTAB  Breasts: soft, nontender, no palpable masses  Abd: + BS, soft, nontender, nondistended, no rebound or guarding, incision clean, dry and intact, uterus firm at midline, 2 fb below umbilicus  : lochia WNL  Extremities: no swelling or calf tenderness                             10.3   16.37 )-----------( 202      ( 21 May 2019 05:46 )             32.8
Post op day one - patient stable, afebrile, tolerating po well  voiding incision clean and dry  CPM

## 2019-05-23 NOTE — PROGRESS NOTE ADULT - ASSESSMENT
A/P: 34y s/p  section, POD#3, stable  -  Pain: PO motrin q6hrs, tylenol q8hrs, oxycodone for severe pain PRN  -  Post-operatively labs: post-op Hgb 10.3 , hemodynamically stable, no symptoms of anemia   -  GI: tolerating regular diet, passing gas, colace PRN  -  : s/p urena , urinating without difficulty  -  DVT prophylaxis: encouraged increased ambulation, SCDs, SQH  -  Dispo: POD 3, likely to discharge home today
A/P   34y  s/p  section, POD #1, stable  -  Pain: PO motrin, percocets for severe pain PRN  -  Post-operatively labs: post-op Hgb 10.3, hemodynamically stable, no symptoms of anemia   -  GI: tolerating clears, passing gas, ADAT  -  : urena in situ; DC this AM, f/u TOV  -  DVT prophylaxis: ambulation, SCDs, SQH  -  Dispo: POD 3 or 4
A/P: 34y s/p  section, POD#2, stable  -  Pain: PO motrin q6hrs, tylenol q8hrs, oxycodone for severe pain PRN  -  Post-operatively labs: post-op Hgb 10.3 , hemodynamically stable, no symptoms of anemia   -  GI: tolerating regular diet, passing gas, colace PRN  -  : s/p urena , urinating without difficulty  -  DVT prophylaxis: encouraged increased ambulation, SCDs, SQH  -  Dispo: POD 3 or 4

## 2019-05-27 DIAGNOSIS — O34.211 MATERNAL CARE FOR LOW TRANSVERSE SCAR FROM PREVIOUS CESAREAN DELIVERY: ICD-10-CM

## 2019-05-27 DIAGNOSIS — Z3A.39 39 WEEKS GESTATION OF PREGNANCY: ICD-10-CM

## 2020-01-30 NOTE — DISCHARGE NOTE OB - CONDITION (STATED IN TERMS THAT PERMIT A SPECIFIC MEASURABLE COMPARISON WITH CONDITION ON ADMISSION):
Patient:   CR GUADALUPE            MRN: [id number]            FIN: [id number]              Age:   54 years     Sex:  MALE     :  61   Associated Diagnoses:   None   Author:   JUAN RAMON, KOFFI     ID Progress Note  S: Seen and examined in [facility]. Remains intubated and sedated. Yellow secretions. Still febrile. Still on insulin drip.  Patient seems more comfortable today  Taken off Precedex  Changed to l propofol  Blood pressure oxygenation stable  Moderate respiratory secretions  Status post mini BAL/urine cultures are all negative so far  Still remains intubated  Completed Zosyn 121 2028  Currently off antibiotics  Antibiotics:  Zosyn -  Unable to review ROS  Chief Complaint   54-year-old patient, status post TOR posterior pharyngectomy for cancer  Patient with evidence of progressive postop fevers, hypoxemia cough increasing secretions ID consulted     Basic Information   Vital signs: Vital Signs   20 00:00 CST Temperature - VS 37.7 deg_C  Normal    Temperature Source - VS Core    Heart/Pulse Rate 81  Normal    Respiration Rate 24 breaths/min  HI    SpO2 96 %  Normal    A-Line Systolic 129  Normal    A-Line Diastolic 51  LOW    Mean-Invasive 74    ETCO2 14   .     History of Present Illness   The patient presents with.     This is a 54-year-old male, with past medical history of DM 2, bilateral cataracts, legally blind, anxiety/depression, presented for left T ORS partial pharyngectomy, left neck dissection on  with  patient initially presented hospital with hemoptysis to [facility], few months ago needle biopsy was performed which showed squamous cell carcinoma, patient postop today, is febrile temperature up to 38.4, tachycardic tachypneic  also was hypoxemic earlier, down to mid 90s  Patient was found to have massive aspiration pneumonia, with ARDS respiratory failure patient had to be intubated, transferred to the  ICU where he is currently sedated and  intubated     54-year-old patient, status post TOR posterior pharyngectomy for cancer  Patient with evidence of progressive postop fevers, hypoxemia cough increasing secretions ID consulted       Review of Systems   Constitutional symptoms:   , Negative given patient's status of intubation/sedation.   Eye symptoms:   .   ENMT symptoms:   .   Cardiovascular symptoms:   .   Respiratory symptoms:   .   Gastrointestinal symptoms:   .   Genitourinary symptoms:   .   Musculoskeletal symptoms:  .   Skin symptoms:   .   Hematologic/Lymphatic symptoms:   .   Neurologic symptoms:  Negative except as documented in HPI,  .    Endocrine symptoms:  Negative except as documented in HPI,  .    Allergy/Immunologic symptoms:   .   Psychiatric symptoms:   .     Past Medical/ Family/ Social History   Medical history: Anxiety  CA - Breast cancer  Cataract  Diabetes  Risk factors for obstructive sleep apnea  Visual impairment   .   Surgical history:  .   Family history:  .   Social history:    Alcohol  Details: Use: Current.  Tobacco  Details: Smoker in [place]: No.  Smoked/Smokeless Tobacco Last 30 Days: No.  Smoking Tobacco Use: Never smoker.  Smokeless Tobacco Use Never.  .   Problem list:    Anxiety  CA - Breast cancer  Cataract  Diabetes  Risk factors for obstructive sleep apnea  Visual impairment  .     Physical Examination   General:  No acute distress,  , Intubated, on vent. Sedated, Not alert,   Skin:  Warm, moist, no rash,  .    Skin:  Warm, dry, intact, Tattoos.    Head:  Normocephalic, atraumatic,  .    Neck:  Supple, trachea midline, no tenderness, no JVD,  .    Eye:  Pupils are equal, round and reactive to light, normal conjunctiva,  .   Ears, nose, mouth and throat:   , +Oral ETT. +Dobhoff.    Cardiovascular:  Regular rate and rhythm, No edema,  , Tachycardic.   Respiratory:  Breath sounds are equal, Symmetrical chest wall expansion,  , Coarse breath sounds.   Gastrointestinal:  Soft, Nontender, Non distended,  .     Genitourinary:  No tenderness,  , Toussaint with orangish urine.    Back:  Nontender,  .    Musculoskeletal:  Normal ROM, normal strength,  .    Neurological:  Unable to assess, sedated.   Psychiatric:   , Unable to assess, sedated.    Lymphatics:  No lymphadenopathy,  .      Medical Decision Making   Labs between:  2020 17:14 to 2020 17:14  CBC:                 WBC  HgB  Hct  Plt  MCV  RDW   2020 8.7  (L) 9.2  (L) 29.6  (H) 542  88.1  12.9   DIFF:                 Seg  Neutroph//ABS  Lymph//ABS  Mono//ABS  EOS/ABS  2020 78  // 6.9 // (L) 0.8  // 0.5 // 0.2  BMP:                 Na  Cl  BUN  Glu   2020 139  104  (H) 22  (H) 168                              K  CO2  Cr  Ca                              3.9  32  (L) 0.57  (L) 7.7   Other Chem:             Mg  Phos  Triglycerides  GGTP  DirectBili                           2.2  3.0         POC GLU:                 Latest Result  Latest Date  Minimum  Min Date  Maximum  Max Date                             97 2020 97 2020 (H) 182  2020  Blood Gas:            Ph  PCO2  PO2  BiCarb  BaseExcess   Arterial:  2020 (H) 7.46  (H) 52  (L) 70  (H) 37  (H) 11   2020 (H) 7.49  (H) 50  (L) 76  (H) 38  (H) 13                              Ionized Ca  Na  K  HgB  Lactic Acid                                      1.2   2020 (H) 7.48  (H) 51  (L) 62  (H) 37  (H) 12                              Ionized Ca  Na  K  HgB  Lactic Acid                                      1.3   2020 (H) 7.52  45  (L) 64  (H) 37  (H) 12                              Ionized Ca  Na  K  HgB  Lactic Acid                                      0.9                  Micro:   MRSA: negative   Sputum cx: many normal upper resp clark   Blood cx: ngtd   MRSA: negative   Mini BAL cx: in progress  MRSA nares -  Blood cultures times two -  Bronchial cultures -  Bronchial cultures -  Imagin/23 CXR:  Findings:  Endotracheal tube terminates in the mid trachea, unchanged.  Dobbhoff tube tip is in the body of the stomach. The cardiac silhouette is stable . No significant interval change in right lower lobe consolidation as well as patchy airspace opacities in the left lower lobe.  No significant pleural effusion or evidence of a pneumothorax. No acute osseous abnormality.  Impression:  No significant interval change. Support devices as above.  1/22 CXR:  IMPRESSION:  Central congestion.  Perihilar and bilateral lower lobe consolidation, right greater than left.     Endotracheal tube terminates above the gladys.  Enteric tube extends below the lower film margin left abdomen.  Worsening bilateral lung mixed airspace opacities, edema or infection.  Combination small volume left pleural fluid is not excluded.  Enlarged cardiomediastinal silhouette obscured by the lung findings.  Chest x-ray 1/30  IMPRESSION:  1.   Worsening bilateral lung mixed airspace opacities.        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 Ireland  - Anemia- per Heme/Onc  - Dysphagia  - DM- per Endo  - Cataracts, legally blind  - Anxiety, depression  -Chest x-ray, with worsening bilateral lung mixed airspace opacities, all cultures negative suspect more pulmonary edema than infection continue diuresis  -Completed IV Zosyn 1/21 to 1/28  -Noted plan to attempt weaning if this fails then plan for tracheostomy possibly on Monday  -Currently afebrile  -Dopplers negative  y  Plan:  - Zosyn started 1/21, completed course 1/28  -Currently off antibiotics  -Afebrile without leukocytosis  -ARDS post massive aspiration pneumonia remains intubated  -Infiltrates persist despite diuresis  Then consider plain  CT of chest to delineate pneumonia from pulmonary edema  WBC within normal limits  Low-grade fevers  Improving oxygen requirements FiO2 down to 40%  - mini BAL negative  - vent wean as able  -Persistent hypoxemia  -Hypercarbia  -Patient is on PE prophylaxis  - monitor clinically, labs, temps  - Heme/Onc, Endocrine, Dr. Ireland following  - CBC, BMP in AM  - d/w STIC RN  - will follow   stable

## 2021-03-19 NOTE — DISCHARGE NOTE OB - VISION (WITH CORRECTIVE LENSES IF THE PATIENT USUALLY WEARS THEM):
Normal vision: sees adequately in most situations; can see medication labels, newsprint

## 2021-11-09 NOTE — PATIENT PROFILE ADULT. - FUNCTIONAL LEVEL PRIOR: BATHING
12th rib fracture, L1 left transverse process fracture 12th rib fracture, L1 left transverse process fracture (0) independent

## 2023-01-19 NOTE — ASU PATIENT PROFILE, ADULT - TEACHING/LEARNING FACTORS IMPACT ABILITY TO LEARN
none [place] Pregnancy And Lactation Text: This medication is not safe during pregnancy. It is unknown if this medication is excreted in breast milk.

## 2024-06-11 ENCOUNTER — EMERGENCY VISIT (OUTPATIENT)
Dept: URBAN - METROPOLITAN AREA CLINIC 10 | Facility: CLINIC | Age: 39
End: 2024-06-11

## 2024-06-11 DIAGNOSIS — B30.0: ICD-10-CM

## 2024-06-11 DIAGNOSIS — H18.232: ICD-10-CM

## 2024-06-11 DIAGNOSIS — H16.002: ICD-10-CM

## 2024-06-11 PROCEDURE — 92004 COMPRE OPH EXAM NEW PT 1/>: CPT

## 2024-06-11 ASSESSMENT — VISUAL ACUITY
OD_CC: 20/20
OS_CC: 20/25

## 2024-06-11 ASSESSMENT — TONOMETRY
OD_IOP_MMHG: 10
OS_IOP_MMHG: 10

## 2024-06-13 ENCOUNTER — FOLLOW UP (OUTPATIENT)
Dept: URBAN - METROPOLITAN AREA CLINIC 10 | Facility: CLINIC | Age: 39
End: 2024-06-13

## 2024-06-13 DIAGNOSIS — B30.0: ICD-10-CM

## 2024-06-13 DIAGNOSIS — H02.88B: ICD-10-CM

## 2024-06-13 PROCEDURE — 99213 OFFICE O/P EST LOW 20 MIN: CPT

## 2024-06-13 ASSESSMENT — VISUAL ACUITY
OS_CC: 20/25-2
OD_CC: 20/30+1

## 2024-06-26 ENCOUNTER — FOLLOW UP (OUTPATIENT)
Dept: URBAN - METROPOLITAN AREA CLINIC 10 | Facility: CLINIC | Age: 39
End: 2024-06-26

## 2024-06-26 DIAGNOSIS — B30.0: ICD-10-CM

## 2024-06-26 DIAGNOSIS — H02.88B: ICD-10-CM

## 2024-06-26 PROCEDURE — 99213 OFFICE O/P EST LOW 20 MIN: CPT

## 2024-06-26 ASSESSMENT — TONOMETRY
OS_IOP_MMHG: 14
OD_IOP_MMHG: 14

## 2024-06-26 ASSESSMENT — VISUAL ACUITY
OS_CC: 20/20
OD_CC: 20/20

## 2024-07-17 ENCOUNTER — FOLLOW UP (OUTPATIENT)
Dept: URBAN - METROPOLITAN AREA CLINIC 10 | Facility: CLINIC | Age: 39
End: 2024-07-17

## 2024-07-17 DIAGNOSIS — H02.88B: ICD-10-CM

## 2024-07-17 DIAGNOSIS — B30.0: ICD-10-CM

## 2024-07-17 PROCEDURE — 99213 OFFICE O/P EST LOW 20 MIN: CPT

## 2024-07-17 ASSESSMENT — TONOMETRY
OD_IOP_MMHG: 13
OS_IOP_MMHG: 14

## 2024-07-17 ASSESSMENT — VISUAL ACUITY
OS_CC: 20/20
OD_CC: 20/20

## (undated) RX ORDER — PREDNISOLONE ACETATE 10 MG/ML: 1 SUSPENSION/ DROPS OPHTHALMIC